# Patient Record
Sex: FEMALE | Race: WHITE | NOT HISPANIC OR LATINO | Employment: UNEMPLOYED | URBAN - METROPOLITAN AREA
[De-identification: names, ages, dates, MRNs, and addresses within clinical notes are randomized per-mention and may not be internally consistent; named-entity substitution may affect disease eponyms.]

---

## 2023-05-03 DIAGNOSIS — K21.9 GERD (GASTROESOPHAGEAL REFLUX DISEASE): Primary | ICD-10-CM

## 2024-01-11 ENCOUNTER — OFFICE VISIT (OUTPATIENT)
Dept: GASTROENTEROLOGY | Facility: CLINIC | Age: 34
End: 2024-01-11
Payer: COMMERCIAL

## 2024-01-11 VITALS
DIASTOLIC BLOOD PRESSURE: 78 MMHG | HEIGHT: 65 IN | HEART RATE: 69 BPM | OXYGEN SATURATION: 97 % | SYSTOLIC BLOOD PRESSURE: 135 MMHG | BODY MASS INDEX: 37.35 KG/M2 | WEIGHT: 224.19 LBS

## 2024-01-11 DIAGNOSIS — R11.2 NAUSEA AND VOMITING, UNSPECIFIED VOMITING TYPE: Primary | ICD-10-CM

## 2024-01-11 DIAGNOSIS — D64.9 ANEMIA, UNSPECIFIED TYPE: ICD-10-CM

## 2024-01-11 DIAGNOSIS — K21.9 GASTROESOPHAGEAL REFLUX DISEASE WITHOUT ESOPHAGITIS: ICD-10-CM

## 2024-01-11 DIAGNOSIS — R19.7 DIARRHEA, UNSPECIFIED TYPE: ICD-10-CM

## 2024-01-11 PROCEDURE — 99204 OFFICE O/P NEW MOD 45 MIN: CPT | Mod: S$GLB,,, | Performed by: INTERNAL MEDICINE

## 2024-01-11 RX ORDER — LEVOTHYROXINE, LIOTHYRONINE 38; 9 UG/1; UG/1
60 TABLET ORAL
COMMUNITY
Start: 2023-12-19 | End: 2024-01-11

## 2024-01-11 RX ORDER — ONDANSETRON 4 MG/1
TABLET, ORALLY DISINTEGRATING ORAL
COMMUNITY

## 2024-01-11 RX ORDER — PROGESTERONE 100 MG/1
CAPSULE ORAL
COMMUNITY
Start: 2023-12-22

## 2024-01-11 RX ORDER — TIRZEPATIDE 2.5 MG/.5ML
2.5 INJECTION, SOLUTION SUBCUTANEOUS
COMMUNITY

## 2024-01-11 RX ORDER — SOD SULF/POT CHLORIDE/MAG SULF 1.479 G
TABLET ORAL
Qty: 24 TABLET | Refills: 0 | Status: SHIPPED | OUTPATIENT
Start: 2024-01-11

## 2024-01-11 RX ORDER — VALACYCLOVIR HYDROCHLORIDE 1 G/1
1000 TABLET, FILM COATED ORAL
COMMUNITY
Start: 2023-12-19

## 2024-01-11 RX ORDER — PANTOPRAZOLE SODIUM 40 MG/1
40 TABLET, DELAYED RELEASE ORAL
COMMUNITY

## 2024-01-11 RX ORDER — IRON,CARBONYL/ASCORBIC ACID 100-250 MG
1 TABLET ORAL
COMMUNITY
Start: 2023-11-02

## 2024-01-11 RX ORDER — VITAMIN B COMPLEX
1 CAPSULE ORAL DAILY
COMMUNITY

## 2024-01-11 RX ORDER — LEVOTHYROXINE, LIOTHYRONINE 57; 13.5 UG/1; UG/1
TABLET ORAL
COMMUNITY
Start: 2023-08-28

## 2024-01-11 NOTE — PATIENT INSTRUCTIONS
Schedule upper and lower endoscopies.   Continue pantoprazole 40 mg daily.   Schedule abdominal ultrasound.     Please notify my office if you have not been contacted within two weeks after any procedures, submitting any samples (biopsies, blood, stool, urine, etc.) or after any imaging (X-ray, CT, MRI, etc.).

## 2024-01-11 NOTE — LETTER
January 11, 2024        Allan Feliciano, PAIGE  [address] [facility]  [place] [address]             [place] [facility]  [address] [place] [address]  Phone: [phone]  Fax: [phone]   Patient: Dania Randle   MR Number: [id number]   YOB: 1990   Date of Visit: 1/11/2024       Dear Dr. Feliciano:    Thank you for referring Dania Randle to me for evaluation. Attached you will find relevant portions of my assessment and plan of care.    If you have questions, please do not hesitate to call me. I look forward to following Dania Randle along with you.    Sincerely,      Dahiana Castillo MD            CC  No Recipients    Enclosure

## 2024-01-11 NOTE — PROGRESS NOTES
Clinic Note    Reason for visit:  The primary encounter diagnosis was Nausea and vomiting, unspecified vomiting type. Diagnoses of Gastroesophageal reflux disease without esophagitis, Diarrhea, unspecified type, and Anemia, unspecified type were also pertinent to this visit.    PCP: Allan Feliciano   [address] [facility] [address] / [place] [address]    HPI:  This is a 33 y.o. female here to be established. Symptoms started 2 years ago. Made appointment with Dr. Underwood but by time she saw him she was pregnant so evaluation was limited. She had her baby in 12/2022 and symptoms persisted so she was referred here. Her symptoms included N/V and diarrhea several times a day. Would usually vomit 1-2 hours after eating but then sometimes she would vomit randomly even without eating. Vomit consisted of food. She has changed her diet over the last 2 years which has helped her symptoms. She stopped red meat, no alcohol, small amounts. The diarrhea was daily, multiple times a day. She did do stool samples at one time likely 2 years ago. She has been on pantoprazole 40 mg daily for 2 years ago now and states it helps significantly. She was without panto for 5 days when she ran out and she had symptoms again. Denies dysphagia. She has not vomited since being on pantoprazole. She started having daily BMs. She is taking Mounjaro and GI symptoms did get worse so she stopped it. She recently got back on Mounjaro but as long as watches her diet she does well. May go a couple days without a BM or will go daily. No blood in stool. She may take ibuprofen once a week or may go weeks without it. She has been anemic since 17 years old. She has taken iron on and off. Has been on iron by mouth consistently since 5/2023. Her menstrual cycle used to be heavy but normal since taking hormones.  No prior EGD/Colonoscopy.       Review of Systems   Constitutional:  Negative for fatigue, fever and unexpected weight change.   HENT:  Negative for mouth  sores, postnasal drip, sore throat and trouble swallowing.    Eyes:  Negative for pain, discharge and eye dryness.   Respiratory:  Negative for apnea, cough, choking, chest tightness, shortness of breath and wheezing.    Cardiovascular:  Negative for chest pain, palpitations and leg swelling.   Gastrointestinal:  Positive for constipation, diarrhea, nausea and reflux. Negative for abdominal distention, abdominal pain, anal bleeding, blood in stool, change in bowel habit, rectal pain, vomiting and fecal incontinence.   Genitourinary:  Negative for bladder incontinence, dysuria and hematuria.   Musculoskeletal:  Positive for back pain. Negative for arthralgias and joint swelling.   Integumentary:  Negative for color change and rash.   Allergic/Immunologic: Negative for environmental allergies and food allergies.   Neurological:  Negative for seizures and headaches.   Hematological:  Negative for adenopathy. Does not bruise/bleed easily.        Past Medical History:   Diagnosis Date    Anemia, unspecified     BMI 37.0-37.9, adult     Disorder of thyroid, unspecified     GERD (gastroesophageal reflux disease)     Insulin resistance     PCOS (polycystic ovarian syndrome)      Past Surgical History:   Procedure Laterality Date    APPENDECTOMY  2008     SECTION      x2     Family History   Problem Relation Age of Onset    Colon cancer Neg Hx     Celiac disease Neg Hx     Crohn's disease Neg Hx     Ulcerative colitis Neg Hx     Liver disease Neg Hx      Social History     Tobacco Use    Smoking status: Every Day     Types: Cigarettes    Smokeless tobacco: Never   Substance Use Topics    Alcohol use: Not Currently     Comment: social    Drug use: Never     Review of patient's allergies indicates:   Allergen Reactions    Iodine     Latex, natural rubber     Sulfa (sulfonamide antibiotics)         Medication List with Changes/Refills   New Medications    SOD SULF-POT CHLORIDE-MAG SULF (SUTAB) 1.479-0.188- 0.225 GRAM  "TABLET    Take according to package instructions with indicated amount of water. No breakfast day before test. May substitute with Suprep, Clenpiq, Plenvu, Moviprep or GoLytely based on Rx plan and patient preference.   Current Medications    B COMPLEX VITAMINS CAPSULE    Take 1 capsule by mouth once daily.    IRON-VITAMIN C 100-250 MG, ICAR-C, 100-250 MG TAB    Take 1 tablet by mouth.    NP THYROID 90 MG TAB    TAKE ONE TABLET BY MOUTH IN THE MORNING EVERY DAY ON AN EMPTY STOMACH    ONDANSETRON (ZOFRAN-ODT) 4 MG TBDL    DISSOLVE ONE tablet BY MOUTH EVERY 8 HOURS AS NEEDED FOR NAUSEA    PANTOPRAZOLE (PROTONIX) 40 MG TABLET    Take 40 mg by mouth.    PROGESTERONE (PROMETRIUM) 100 MG CAPSULE    TAKE 1-2 CAPSULES BY MOUTH ONCE NIGHTLY AT BEDTIME    TIRZEPATIDE (MOUNJARO) 2.5 MG/0.5 ML PNIJ    Inject 2.5 mg into the skin every 7 days.    VALACYCLOVIR (VALTREX) 1000 MG TABLET    Take 1,000 mg by mouth.   Discontinued Medications    NP THYROID 60 MG TAB    Take 60 mg by mouth.         Vital Signs:  /78   Pulse 69   Ht 5' 5" (1.651 m)   Wt 101.7 kg (224 lb 3.2 oz)   SpO2 97%   BMI 37.31 kg/m²         Physical Exam  Vitals reviewed.   Constitutional:       General: She is awake. She is not in acute distress.     Appearance: Normal appearance. She is well-developed. She is obese. She is not ill-appearing, toxic-appearing or diaphoretic.   HENT:      Head: Normocephalic and atraumatic.      Nose: Nose normal.      Mouth/Throat:      Mouth: Mucous membranes are moist.      Pharynx: Oropharynx is clear. No oropharyngeal exudate or posterior oropharyngeal erythema.   Eyes:      General: Lids are normal. Gaze aligned appropriately. No scleral icterus.        Right eye: No discharge.         Left eye: No discharge.      Conjunctiva/sclera: Conjunctivae normal.   Neck:      Trachea: Trachea normal.   Cardiovascular:      Rate and Rhythm: Normal rate and regular rhythm.      Pulses:           Radial pulses are 2+ on the " right side and 2+ on the left side.   Pulmonary:      Effort: Pulmonary effort is normal. No respiratory distress.      Breath sounds: No stridor. No wheezing.   Chest:      Chest wall: No tenderness.   Abdominal:      General: Bowel sounds are normal. There is no distension.      Palpations: Abdomen is soft. There is no fluid wave, hepatomegaly or mass.      Tenderness: There is no abdominal tenderness. There is no guarding or rebound.   Musculoskeletal:         General: No tenderness or deformity.      Cervical back: Full passive range of motion without pain and neck supple. No tenderness.      Right lower leg: No edema.      Left lower leg: No edema.   Lymphadenopathy:      Cervical: No cervical adenopathy.   Skin:     General: Skin is warm and dry.      Capillary Refill: Capillary refill takes less than 2 seconds.      Coloration: Skin is not cyanotic, jaundiced or pale.   Neurological:      General: No focal deficit present.      Mental Status: She is alert and oriented to person, place, and time.      Motor: No tremor.   Psychiatric:         Attention and Perception: Attention normal.         Mood and Affect: Mood and affect normal.         Speech: Speech normal.         Behavior: Behavior normal. Behavior is cooperative.            All of the data above and below has been reviewed by myself and any further interpretations will be reflected in the assessment and plan.   The data includes review of external notes, and independent interpretation of lab results, procedures, x-rays, and imaging reports.      Assessment:  Nausea and vomiting, unspecified vomiting type  -     Ambulatory Referral to External Surgery  -     US Abdomen Complete; Future; Expected date: 01/11/2024    Gastroesophageal reflux disease without esophagitis  -     Ambulatory referral/consult to Gastroenterology    Diarrhea, unspecified type  -     Ambulatory Referral to External Surgery  -     sod sulf-pot chloride-mag sulf (SUTAB)  1.479-0.188- 0.225 gram tablet; Take according to package instructions with indicated amount of water. No breakfast day before test. May substitute with Suprep, Clenpiq, Plenvu, Moviprep or GoLytely based on Rx plan and patient preference.  Dispense: 24 tablet; Refill: 0    Anemia, unspecified type  -     Ambulatory Referral to External Surgery    GERD v gastroparesis v IBS v H pylori v infl v GB. Plan for EGD/Colon with biopsies.  Will also get US to look at GB.      Recommendations:  Schedule upper and lower endoscopies.   Continue pantoprazole 40 mg daily.   Schedule abdominal ultrasound.     Risks, benefits, and alternatives of medical management, any associated procedures, and/or treatment discussed with the patient. Patient given opportunity to ask questions and voices understanding. Patient has elected to proceed with the recommended care modalities as discussed.    Instructed patient to notify my office if they have not been contacted within two weeks after any procedures, submitting any samples (biopsies, blood, stool, urine, etc.) or after any imaging (X-ray, CT, MRI, etc.).     Follow up in about 6 months (around 7/11/2024).    Order summary:  Orders Placed This Encounter   Procedures    US Abdomen Complete    Ambulatory Referral to External Surgery      This assessment, plan, and documentation was performed in collaboration with Susan Blair NP.      This document may have been created using a voice recognition transcribing system. Incorrect words or phrases may have been missed during proofreading. Please interpret accordingly or contact me for clarification.     Dahiana Castillo MD

## 2024-02-01 ENCOUNTER — TELEPHONE (OUTPATIENT)
Dept: GASTROENTEROLOGY | Facility: CLINIC | Age: 34
End: 2024-02-01
Payer: COMMERCIAL

## 2024-02-01 NOTE — TELEPHONE ENCOUNTER
On oral iron since 5/2023. No longer having heavy menstrual cycle.     TPL results reviewed:  12/14/2023: CBC/TSH/FT4/FT3 wnl, Hgb 15.8  7/2023: ferr/iron/CBC/CMP/TSH/FT4 nl  4/2023: ferr 7.38L, Iron 32L, B12/TSH/CMP wnl, Hgb 12.9, MCV 79.5L, CBC onl, Vit D 19.7L  11/2022: Hgb electrophoresis wnl  [facility]

## 2024-03-15 ENCOUNTER — TELEPHONE (OUTPATIENT)
Dept: GASTROENTEROLOGY | Facility: CLINIC | Age: 34
End: 2024-03-15
Payer: COMMERCIAL

## 2024-03-15 VITALS — BODY MASS INDEX: 37.32 KG/M2 | HEIGHT: 65 IN | WEIGHT: 224 LBS

## 2024-03-15 DIAGNOSIS — R19.7 DIARRHEA, UNSPECIFIED TYPE: ICD-10-CM

## 2024-03-15 DIAGNOSIS — D64.9 ANEMIA, UNSPECIFIED TYPE: ICD-10-CM

## 2024-03-15 DIAGNOSIS — R11.2 NAUSEA AND VOMITING, UNSPECIFIED VOMITING TYPE: Primary | ICD-10-CM

## 2024-03-15 NOTE — TELEPHONE ENCOUNTER
Called and left a detailed message that I was calling as a courtesy regarding her upcoming EGD/COLON with [facility] on 3/26/24,Tues, and want to verify that that she has her paper prep instructions and meds. I reminded pt not to take Mounjaro injection 7 days before the procedure and to wait until after the procedure to resume.  I also mentioned that [facility] will call the day before (Monday) with the arrival time, GI Lab is located on the third floor, and to pre-register next week. [email or web address]

## 2024-03-21 ENCOUNTER — TELEPHONE (OUTPATIENT)
Dept: GASTROENTEROLOGY | Facility: CLINIC | Age: 34
End: 2024-03-21
Payer: COMMERCIAL

## 2024-03-21 NOTE — TELEPHONE ENCOUNTER
Per pt's message I have cancelled 3/26/24 procedure. Pt does not wish at this time to reschedule. Updated Epic schedule and notified GI Lab. [email or web address]

## 2024-03-21 NOTE — TELEPHONE ENCOUNTER
----- Message from Mane Lima sent at 3/21/2024  8:43 AM CDT -----  Contact: self  Patient Dania Randle is calling to request to cancel her procedures coming up next week. She does not wish to reschedule at this time. Please call back at [phone] if any questions about this request